# Patient Record
Sex: FEMALE | Race: WHITE | URBAN - METROPOLITAN AREA
[De-identification: names, ages, dates, MRNs, and addresses within clinical notes are randomized per-mention and may not be internally consistent; named-entity substitution may affect disease eponyms.]

---

## 2020-02-14 ENCOUNTER — TELEPHONE (OUTPATIENT)
Dept: SURGERY | Age: 35
End: 2020-02-14

## 2020-03-11 ENCOUNTER — OFFICE VISIT (OUTPATIENT)
Dept: SURGERY | Age: 35
End: 2020-03-11
Payer: COMMERCIAL

## 2020-03-11 VITALS
HEIGHT: 66 IN | RESPIRATION RATE: 16 BRPM | DIASTOLIC BLOOD PRESSURE: 76 MMHG | BODY MASS INDEX: 19.48 KG/M2 | TEMPERATURE: 98 F | SYSTOLIC BLOOD PRESSURE: 116 MMHG | HEART RATE: 72 BPM | OXYGEN SATURATION: 99 % | WEIGHT: 121.2 LBS

## 2020-03-11 PROCEDURE — 1036F TOBACCO NON-USER: CPT | Performed by: SURGERY

## 2020-03-11 PROCEDURE — 99203 OFFICE O/P NEW LOW 30 MIN: CPT | Performed by: SURGERY

## 2020-03-11 PROCEDURE — G8427 DOCREV CUR MEDS BY ELIG CLIN: HCPCS | Performed by: SURGERY

## 2020-03-11 PROCEDURE — G8484 FLU IMMUNIZE NO ADMIN: HCPCS | Performed by: SURGERY

## 2020-03-11 PROCEDURE — G8420 CALC BMI NORM PARAMETERS: HCPCS | Performed by: SURGERY

## 2020-03-11 RX ORDER — CITALOPRAM 20 MG/1
20 TABLET ORAL DAILY
COMMUNITY

## 2020-03-11 RX ORDER — NORETHINDRONE ACETATE AND ETHINYL ESTRADIOL 1MG-20(21)
1 KIT ORAL DAILY
COMMUNITY

## 2020-03-11 NOTE — PROGRESS NOTES
[address]    Consultation requested by: Danniellesav Espinoza (Patient)    CC: Umbilical hernia    HPI: 30 y.o. female with a PMHx as delineated below who presents in consultation for abdominal hernia. She has not had any symptoms and states that it is a similar size as it has been in the past.  She noted this after her 3rd child (she does not want any additional children). She notes that when she wears clothing (especially bathing suits), it is noticeable. She presents for discussion regarding surgical options.     Current BMI: 19.56  History of DVT/PE: No  Smoking: No   Pregnancy / Miscarriage: 4 / 1    PMHx/PSHx: None    Social History     Socioeconomic History    Marital status:      Spouse name: Not on file    Number of children: Not on file    Years of education: Not on file    Highest education level: Not on file   Occupational History    Not on file   Social Needs    Financial resource strain: Not on file    Food insecurity     Worry: Not on file     Inability: Not on file    Transportation needs     Medical: Not on file     Non-medical: Not on file   Tobacco Use    Smoking status: Never Smoker   Substance and Sexual Activity    Alcohol use: No    Drug use: No    Sexual activity: Yes     Partners: Male   Lifestyle    Physical activity     Days per week: Not on file     Minutes per session: Not on file    Stress: Not on file   Relationships    Social connections     Talks on phone: Not on file     Gets together: Not on file     Attends Rastafari service: Not on file     Active member of club or organization: Not on file     Attends meetings of clubs or organizations: Not on file     Relationship status: Not on file    Intimate partner violence     Fear of current or ex partner: Not on file     Emotionally abused: Not on file     Physically abused: Not on file     Forced sexual activity: Not on file   Other Topics Concern    Not on file   Social History proceed and contact the office. The pathology of abdominal wall hernias was discussed with the patient. Clinical photos were obtained. The patient was also advised that the procedure can be combined with an abdominoplasty for improved contour / repair of her rectus diastasis. The risks, benefits, alternatives, outcomes, personnel involved were discussed and all questions were answered in a satisfactory manner according to the patient. Specifically, the risks including, but not limited to: bleeding possibly requiring transfusion or reoperation, infection, seroma, nonhealing, recurrence necessitating reoperation, poor cosmetic outcome, loss of umbilicus, loss of abdominal wall rigidity, bulge without hernia, lateral hernia, bowel or bladder injury, possible stoma, scarring, VTE (DVT/PE), and death were discussed.      Faustina Reich MD  [facility]  03/11/20